# Patient Record
(demographics unavailable — no encounter records)

---

## 2024-11-04 NOTE — HISTORY OF PRESENT ILLNESS
[Mother] : mother [Yes] : Patient goes to dentist yearly [Eats meals with family] : eats meals with family [NO] : No [FreeTextEntry1] : LEONARDO COLON is a 15 year here for well

## 2024-11-04 NOTE — PHYSICAL EXAM
[Alert] : alert [No Acute Distress] : no acute distress [Normocephalic] : normocephalic [EOMI Bilateral] : EOMI bilateral [Clear tympanic membranes with bony landmarks and light reflex present bilaterally] : clear tympanic membranes with bony landmarks and light reflex present bilaterally  [Pink Nasal Mucosa] : pink nasal mucosa [Nonerythematous Oropharynx] : nonerythematous oropharynx [Supple, full passive range of motion] : supple, full passive range of motion [No Palpable Masses] : no palpable masses [Clear to Auscultation Bilaterally] : clear to auscultation bilaterally [Regular Rate and Rhythm] : regular rate and rhythm [Normal S1, S2 audible] : normal S1, S2 audible [No Murmurs] : no murmurs [+2 Femoral Pulses] : +2 femoral pulses [Soft] : soft [NonTender] : non tender [Non Distended] : non distended [Normoactive Bowel Sounds] : normoactive bowel sounds [No Hepatomegaly] : no hepatomegaly [No Splenomegaly] : no splenomegaly [Delvin: _____] : Delvin [unfilled] [Circumcised] : circumcised [Bilateral descended testes] : bilateral descended testes [No Testicular Masses] : no testicular masses [No Abnormal Lymph Nodes Palpated] : no abnormal lymph nodes palpated [Normal Muscle Tone] : normal muscle tone [No Gait Asymmetry] : no gait asymmetry [No pain or deformities with palpation of bone, muscles, joints] : no pain or deformities with palpation of bone, muscles, joints [Straight] : straight [+2 Patella DTR] : +2 patella DTR [Cranial Nerves Grossly Intact] : cranial nerves grossly intact [No Rash or Lesions] : no rash or lesions

## 2025-05-14 NOTE — CONSULT LETTER
[Dear  ___] : Dear  [unfilled], [Consult Letter:] : I had the pleasure of evaluating your patient, [unfilled]. [Consult Closing:] : Thank you very much for allowing me to participate in the care of this patient.  If you have any questions, please do not hesitate to contact me. [Sincerely,] : Sincerely, [FreeTextEntry2] : Dr. Eloina Reveles [FreeTextEntry3] : Hilton Moreira MD      Pediatric Otolaryngology      [address] [address]      Tel [phone]      Fax [phone]

## 2025-05-14 NOTE — HISTORY OF PRESENT ILLNESS
[de-identified] : 17 y/o M here for initial evaluation for nose bleeds The patient presents with right sided nose bleed forseveral years. Previous cauderization x3 few years ago  This worsens in the spring may be associated with nose blowing/picking.  The bleeds typically self resolve or REQUIRE DIGITAL PRESSURE FOR up to 10 Minutes.  There is nasal congestion with nosebleeds  The patient has tried NASAL SALINE SPRAYS in the past.  There is no family history of EASY BRUISING/BLEEDING  There is history of snoring, mouth breathing or witnessed pauses.  Sleep study done in 2021 showed mild obstructive sleep apnea.  No throat/tonsil infections.  No problems with swallowing or with VPI/Speech/nasal regurgitation.  Passed NBHT AU.  31 week term, uncomplicated delivery with uncomplicated pregnancy.  No cyanosis, no ETT intubation, no home oxygen requirement, 5 week NICU stay.

## 2025-05-14 NOTE — CONSULT LETTER
[Dear  ___] : Dear  [unfilled], [Consult Letter:] : I had the pleasure of evaluating your patient, [unfilled]. [Consult Closing:] : Thank you very much for allowing me to participate in the care of this patient.  If you have any questions, please do not hesitate to contact me. [Sincerely,] : Sincerely, [FreeTextEntry2] : Dr. Eloina Reveles [FreeTextEntry3] : Hilton Moreira MD      Pediatric Otolaryngology      [address] [address]      Tel [phone]      Fax [phone]

## 2025-05-14 NOTE — DATA REVIEWED
[FreeTextEntry1] : 1. referral documents and prior notes reviewed  2. history obtained from primary caregiver as independent historian due to patient's developmental age and limited recall

## 2025-05-14 NOTE — REASON FOR VISIT
[Initial Evaluation] : an initial evaluation for [Mother] : mother [Patient] : patient [Parents] : parents [FreeTextEntry2] : nose bleeds

## 2025-05-14 NOTE — HISTORY OF PRESENT ILLNESS
[de-identified] : 15 y/o M here for initial evaluation for nose bleeds The patient presents with right sided nose bleed forseveral years. Previous cauderization x3 few years ago  This worsens in the spring may be associated with nose blowing/picking.  The bleeds typically self resolve or REQUIRE DIGITAL PRESSURE FOR up to 10 Minutes.  There is nasal congestion with nosebleeds  The patient has tried NASAL SALINE SPRAYS in the past.  There is no family history of EASY BRUISING/BLEEDING  There is history of snoring, mouth breathing or witnessed pauses.  Sleep study done in 2021 showed mild obstructive sleep apnea.  No throat/tonsil infections.  No problems with swallowing or with VPI/Speech/nasal regurgitation.  Passed NBHT AU.  31 week term, uncomplicated delivery with uncomplicated pregnancy.  No cyanosis, no ETT intubation, no home oxygen requirement, 5 week NICU stay.